# Patient Record
Sex: FEMALE | Race: WHITE | NOT HISPANIC OR LATINO | URBAN - METROPOLITAN AREA
[De-identification: names, ages, dates, MRNs, and addresses within clinical notes are randomized per-mention and may not be internally consistent; named-entity substitution may affect disease eponyms.]

---

## 2019-04-08 ENCOUNTER — OFFICE VISIT (OUTPATIENT)
Dept: URBAN - METROPOLITAN AREA CLINIC 29 | Facility: CLINIC | Age: 79
End: 2019-04-08
Payer: COMMERCIAL

## 2019-04-08 DIAGNOSIS — Z96.1 PRESENCE OF PSEUDOPHAKIA: ICD-10-CM

## 2019-04-08 DIAGNOSIS — H31.091 OTHER CHORIORETINAL SCARS, RIGHT EYE: ICD-10-CM

## 2019-04-08 PROCEDURE — 92014 COMPRE OPH EXAM EST PT 1/>: CPT | Performed by: OPTOMETRIST

## 2019-04-08 ASSESSMENT — INTRAOCULAR PRESSURE
OS: 16
OD: 13

## 2019-04-08 NOTE — IMPRESSION/PLAN
Impression: Presence of pseudophakia: Z96.1. OS. Plan: Condition is stable, no further treatment at this time. Monitor. Pt advised that vision can be improved with glasses, deferred refraction today. RTC prn x refraction.

## 2019-04-08 NOTE — IMPRESSION/PLAN
Impression: Punctate keratitis, left eye: H16.142. OS. Plan: Pt ed re: condition. Continue AT's BID-QID OS, Omega-3 fatty acids, humidifier, and laura QHS OS. RTC prn x follow up; otherwise, return in 1 year.

## 2019-04-08 NOTE — IMPRESSION/PLAN
Impression: Age-related nuclear cataract, right eye: H25.11. OD. Plan: Mild, not visually significant at this time, observe.

## 2019-04-08 NOTE — IMPRESSION/PLAN
Impression: Other chorioretinal scars, right eye: H31.091. OD. Plan: Stable, no tear or hole. Monitor condition. RD signs/symptoms reviewed - RTC immediately if experienced.

## 2020-10-21 ENCOUNTER — OFFICE VISIT (OUTPATIENT)
Dept: URBAN - METROPOLITAN AREA CLINIC 16 | Facility: CLINIC | Age: 80
End: 2020-10-21
Payer: COMMERCIAL

## 2020-10-21 DIAGNOSIS — G51.0 BELL'S PALSY: ICD-10-CM

## 2020-10-21 DIAGNOSIS — H16.142 PUNCTATE KERATITIS, LEFT EYE: ICD-10-CM

## 2020-10-21 DIAGNOSIS — H25.11 AGE-RELATED NUCLEAR CATARACT, RIGHT EYE: Primary | ICD-10-CM

## 2020-10-21 PROCEDURE — 92014 COMPRE OPH EXAM EST PT 1/>: CPT | Performed by: OPTOMETRIST

## 2020-10-21 ASSESSMENT — KERATOMETRY
OS: 44.38
OD: 44.25

## 2020-10-21 ASSESSMENT — INTRAOCULAR PRESSURE
OD: 13
OS: 13

## 2020-10-21 NOTE — IMPRESSION/PLAN
Impression: Age-related nuclear cataract, right eye: H25.11. Plan: No treatment currently recommended due to level of vision. Patient will monitor vision changes and contact us with any decrease in vision. Will reevaluate cataracts on return visit.

## 2020-10-21 NOTE — IMPRESSION/PLAN
Impression: Bell's palsy: G51.0. Left. Longstanding Plan: Discussed diagnosis. Will continue to observe.

## 2020-10-28 ENCOUNTER — OFFICE VISIT (OUTPATIENT)
Dept: URBAN - METROPOLITAN AREA CLINIC 16 | Facility: CLINIC | Age: 80
End: 2020-10-28
Payer: COMMERCIAL

## 2020-10-28 DIAGNOSIS — H52.223 REGULAR ASTIGMATISM, BILATERAL: Primary | ICD-10-CM

## 2020-10-28 PROCEDURE — 92012 INTRM OPH EXAM EST PATIENT: CPT | Performed by: OPTOMETRIST

## 2020-10-28 ASSESSMENT — VISUAL ACUITY
OD: 20/25
OS: 20/40

## 2022-01-24 ENCOUNTER — OFFICE VISIT (OUTPATIENT)
Dept: URBAN - METROPOLITAN AREA CLINIC 16 | Facility: CLINIC | Age: 82
End: 2022-01-24
Payer: COMMERCIAL

## 2022-01-24 PROCEDURE — 99213 OFFICE O/P EST LOW 20 MIN: CPT | Performed by: OPTOMETRIST

## 2022-01-24 ASSESSMENT — KERATOMETRY
OS: 45.50
OD: 44.63

## 2022-01-24 ASSESSMENT — INTRAOCULAR PRESSURE
OD: 14
OS: 15

## 2022-01-24 NOTE — IMPRESSION/PLAN
Impression: Other chorioretinal scars, right eye: H31.091 OD. Plan: Discussed diagnosis. Will continue to observe.

## 2022-01-24 NOTE — IMPRESSION/PLAN
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 secondary to Bell's Palsy, long standing Plan: Discussed diagnosis in detail with patient. Patient instructed to use artificial tears as needed.

## 2022-03-21 ENCOUNTER — OFFICE VISIT (OUTPATIENT)
Dept: URBAN - METROPOLITAN AREA CLINIC 16 | Facility: CLINIC | Age: 82
End: 2022-03-21
Payer: COMMERCIAL

## 2022-03-21 DIAGNOSIS — H10.023 OTHER MUCOPURULENT CONJUNCTIVITIS, BILATERAL: Primary | ICD-10-CM

## 2022-03-21 DIAGNOSIS — H04.123 DRY EYE SYNDROME OF BILATERAL LACRIMAL GLANDS: ICD-10-CM

## 2022-03-21 PROCEDURE — 99213 OFFICE O/P EST LOW 20 MIN: CPT | Performed by: OPTOMETRIST

## 2022-03-21 RX ORDER — TOBRAMYCIN AND DEXAMETHASONE 3; 1 MG/ML; MG/ML
SUSPENSION/ DROPS OPHTHALMIC
Qty: 5 | Refills: 0 | Status: INACTIVE
Start: 2022-03-21 | End: 2022-03-28

## 2022-03-21 ASSESSMENT — INTRAOCULAR PRESSURE
OS: 14
OD: 13

## 2022-03-21 NOTE — IMPRESSION/PLAN
Impression: Other mucopurulent conjunctivitis, bilateral: H10.023. Plan: Discussed diagnosis in detail with patient. New medication(s) Rx given today.

## 2022-11-30 ENCOUNTER — OFFICE VISIT (OUTPATIENT)
Dept: URBAN - METROPOLITAN AREA CLINIC 16 | Facility: CLINIC | Age: 82
End: 2022-11-30
Payer: MEDICARE

## 2022-11-30 DIAGNOSIS — H52.223 REGULAR ASTIGMATISM, BILATERAL: Primary | ICD-10-CM

## 2022-11-30 ASSESSMENT — KERATOMETRY
OS: 42.50
OD: 45.13

## 2022-11-30 ASSESSMENT — INTRAOCULAR PRESSURE
OS: 13
OD: 14

## 2022-11-30 ASSESSMENT — VISUAL ACUITY
OS: 20/30
OD: 20/30

## 2022-11-30 NOTE — IMPRESSION/PLAN
Impression: Regular astigmatism, bilateral: H52.223. Plan: Refractive error accounts for symptoms. Release SRX. 
RTC 3 months x CFM, dilate exam.

## 2024-05-20 ENCOUNTER — OFFICE VISIT (OUTPATIENT)
Dept: URBAN - METROPOLITAN AREA CLINIC 16 | Facility: CLINIC | Age: 84
End: 2024-05-20
Payer: MEDICARE

## 2024-05-20 DIAGNOSIS — Z96.1 PRESENCE OF INTRAOCULAR LENS: ICD-10-CM

## 2024-05-20 DIAGNOSIS — H25.11 AGE-RELATED NUCLEAR CATARACT, RIGHT EYE: Primary | ICD-10-CM

## 2024-05-20 DIAGNOSIS — G51.0 BELL'S PALSY: ICD-10-CM

## 2024-05-20 DIAGNOSIS — H16.142 PUNCTATE KERATITIS, LEFT EYE: ICD-10-CM

## 2024-05-20 PROCEDURE — 99213 OFFICE O/P EST LOW 20 MIN: CPT | Performed by: OPTOMETRIST

## 2024-05-20 ASSESSMENT — INTRAOCULAR PRESSURE
OS: 16
OD: 16